# Patient Record
Sex: FEMALE | Race: WHITE | Employment: OTHER | URBAN - METROPOLITAN AREA
[De-identification: names, ages, dates, MRNs, and addresses within clinical notes are randomized per-mention and may not be internally consistent; named-entity substitution may affect disease eponyms.]

---

## 2017-08-23 ENCOUNTER — OFFICE VISIT (OUTPATIENT)
Dept: FAMILY MEDICINE CLINIC | Age: 67
End: 2017-08-23

## 2017-08-23 VITALS
RESPIRATION RATE: 16 BRPM | OXYGEN SATURATION: 98 % | BODY MASS INDEX: 26.87 KG/M2 | DIASTOLIC BLOOD PRESSURE: 79 MMHG | SYSTOLIC BLOOD PRESSURE: 132 MMHG | WEIGHT: 167.2 LBS | TEMPERATURE: 97 F | HEIGHT: 66 IN | HEART RATE: 83 BPM

## 2017-08-23 DIAGNOSIS — B07.9 VIRAL WARTS, UNSPECIFIED TYPE: ICD-10-CM

## 2017-08-23 DIAGNOSIS — M81.0 POST-MENOPAUSAL OSTEOPOROSIS: Primary | ICD-10-CM

## 2017-08-23 DIAGNOSIS — Z23 ENCOUNTER FOR IMMUNIZATION: ICD-10-CM

## 2017-08-23 RX ORDER — RISEDRONATE SODIUM 35 MG/1
35 TABLET, FILM COATED ORAL
COMMUNITY
End: 2017-08-23 | Stop reason: SDUPTHER

## 2017-08-23 RX ORDER — METRONIDAZOLE 10 MG/G
GEL TOPICAL DAILY
COMMUNITY
End: 2018-09-27 | Stop reason: SDUPTHER

## 2017-08-23 RX ORDER — DOXYCYCLINE 50 MG/1
50 CAPSULE ORAL 2 TIMES DAILY
COMMUNITY
End: 2018-04-20 | Stop reason: ALTCHOICE

## 2017-08-23 RX ORDER — SULFACETAMIDE SODIUM AND SULFUR 100; 20 MG/G; MG/G
CREAM TOPICAL
COMMUNITY

## 2017-08-23 RX ORDER — LORATADINE 10 MG/1
10 TABLET ORAL
COMMUNITY

## 2017-08-23 RX ORDER — CEPHALEXIN 500 MG/1
500 CAPSULE ORAL 4 TIMES DAILY
COMMUNITY
End: 2018-04-20 | Stop reason: ALTCHOICE

## 2017-08-23 RX ORDER — RISEDRONATE SODIUM 35 MG/1
35 TABLET, FILM COATED ORAL
Qty: 12 TAB | Refills: 3 | Status: SHIPPED | OUTPATIENT
Start: 2017-08-23 | End: 2018-08-09 | Stop reason: SDUPTHER

## 2017-08-23 NOTE — PROGRESS NOTES
Lamberto Vegas is a 74 y.o.  female and presents with    Chief Complaint   Patient presents with    Osteoporosis           Subjective:  Mrs. Homer Leal is working in the area as a [address] for her [employer]. She is here to establish care. She has h/o osteoporosis diagnosed 4 years ago; she denies h/o fractures. She denies side effects from medication and is requesting a refill. She c/o lesion on her right second finger. She has attempted to remove it with scraping. She has not applied medication to it. It is intermittently painful. She denies bleeding. She denies redness. No injury to her finger. Patient Active Problem List   Diagnosis Code    Post-menopausal osteoporosis M81.0     Patient Active Problem List    Diagnosis Date Noted    Post-menopausal osteoporosis 08/28/2017     Current Outpatient Prescriptions   Medication Sig Dispense Refill    metroNIDAZOLE (METROGEL) 1 % topical gel Apply  to affected area daily. Use a thin layer to affected areas after washing      Sulfacetamide Sodium-Sulfur (AVAR-E LS) 10-2 % crea by Apply Externally route.  risedronate (ACTONEL) 35 mg tablet Take 35 mg by mouth every seven (7) days.  doxycycline (MONODOX) 50 mg capsule Take 50 mg by mouth two (2) times a day.  cephALEXin (KEFLEX) 500 mg capsule Take 500 mg by mouth four (4) times daily.  loratadine (CLARITIN) 10 mg tablet Take 10 mg by mouth.        No Known Allergies  Past Medical History:   Diagnosis Date    Osteoporosis     Rosacea     TIA (transient ischemic attack) 2012     Past Surgical History:   Procedure Laterality Date    HX COLONOSCOPY       Family History   Problem Relation Age of Onset    Diabetes Mother     Osteoporosis Father      Social History   Substance Use Topics    Smoking status: Never Smoker    Smokeless tobacco: Never Used    Alcohol use No       ROS   General ROS: negative for - chills or fever  Psychological ROS: negative for - anxiety or depression  Ophthalmic ROS: negative for - blurry vision  ENT ROS: negative for - headaches  Endocrine ROS: negative for - polydipsia/polyuria or temperature intolerance  Respiratory ROS: no cough, shortness of breath, or wheezing  Cardiovascular ROS: no chest pain or dyspnea on exertion  Gastrointestinal ROS: no abdominal pain, change in bowel habits, or black or bloody stools  Genito-Urinary ROS: no dysuria, trouble voiding, or hematuria  Musculoskeletal ROS: negative for - joint pain or muscle pain  Neurological ROS: negative for - numbness/tingling or weakness    All other systems reviewed and are negative. Objective:  Vitals:    08/23/17 1401   BP: 132/79   Pulse: 83   Resp: 16   Temp: 97 °F (36.1 °C)   TempSrc: Oral   SpO2: 98%   Weight: 167 lb 3.2 oz (75.8 kg)   Height: 5' 6\" (1.676 m)   PainSc:   0 - No pain       alert, well appearing, and in no distress, oriented to person, place, and time and normal appearing weight  Mental status - normal mood, behavior, speech, dress, motor activity, and thought processes  Chest - clear to auscultation, no wheezes, rales or rhonchi, symmetric air entry  Heart - normal rate, regular rhythm, normal S1, S2, no murmurs, rubs, clicks or gallops  Neurological - cranial nerves II through XII intact, normal gait and station  Skin - lesion on right 2nd finger raised and flesh colored; scraped with sterile 11 blade and 3 freeze thaw cycles with liquid nitrogen performed. Assessment/Plan:    1. Post-menopausal osteoporosis  Tolerating medication well; continue treatment  - risedronate (ACTONEL) 35 mg tablet; Take 1 Tab by mouth every seven (7) days. Indications: POST-MENOPAUSAL OSTEOPOROSIS  Dispense: 12 Tab; Refill: 3    2.  Encounter for immunization    - ADMIN INFLUENZA VIRUS VAC  - INFLUENZA VIRUS VACCINE, HIGH DOSE SEASONAL, PRESERVATIVE FREE    3. Viral warts, unspecified type  Tolerated procedure well; dressed with sterile bandage  - DESTRUC BENIGN LESION, UP TO 14 LESIONS      Lab review: orders written for new lab studies as appropriate; see orders      I have discussed the diagnosis with the patient and the intended plan as seen in the above orders. The patient has received an after-visit summary and questions were answered concerning future plans. I have discussed medication side effects and warnings with the patient as well. I have reviewed the plan of care with the patient, accepted their input and they are in agreement with the treatment goals. Follow-up Disposition:  Return in about 1 month (around 9/23/2017) for medicare wellness.

## 2017-08-23 NOTE — PROGRESS NOTES
1. Have you been to the ER, urgent care clinic since your last visit? Hospitalized since your last visit? No    2. Have you seen or consulted any other health care providers outside of the [address] [place] [address] since your last visit? Include any pap smears or colon screening.  No

## 2017-08-23 NOTE — PROGRESS NOTES
1. Have you been to the ER, urgent care clinic since your last visit? Hospitalized since your last visit? No    2. Have you seen or consulted any other health care providers outside of the [address] [place] [address] since your last visit? Include any pap smears or colon screening.  No

## 2017-08-23 NOTE — MR AVS SNAPSHOT
Visit Information Date & Time Provider Department Dept. Phone Encounter #  
 8/23/2017  2:15 PM Fidelia Alarcon, [address] [phone] Follow-up Instructions Return in about 1 month (around 9/23/2017) for medicare wellness. Upcoming Health Maintenance Date Due Hepatitis C Screening 1950 DTaP/Tdap/Td series (1 - Tdap) 2/19/1973 BREAST CANCER SCRN MAMMOGRAM 2/19/2002 ZOSTER VACCINE AGE 60> 12/19/2011 GLAUCOMA SCREENING Q2Y 2/19/2017 OSTEOPOROSIS SCREENING (DEXA) 2/19/2017 Pneumococcal 65+ Low/Medium Risk (1 of 2 - PCV13) 2/19/2017 MEDICARE YEARLY EXAM 2/19/2017 INFLUENZA AGE 9 TO ADULT 8/1/2017 COLONOSCOPY 7/16/2022 Allergies as of 8/23/2017  Review Complete On: 8/23/2017 By: Fidelia Alarcon MD  
 No Known Allergies Current Immunizations  Never Reviewed Name Date Influenza High Dose Vaccine PF  Incomplete Not reviewed this visit You Were Diagnosed With   
  
 Codes Comments Post-menopausal osteoporosis    -  Primary ICD-10-CM: M81.0 ICD-9-CM: 733.01 Encounter for immunization     ICD-10-CM: P43 ICD-9-CM: V03.89 Viral warts, unspecified type     ICD-10-CM: B07.9 ICD-9-CM: 078.10 Vitals BP Pulse Temp Resp Height(growth percentile) Weight(growth percentile) 132/79 (BP 1 Location: Right arm, BP Patient Position: Sitting) 83 97 °F (36.1 °C) (Oral) 16 5' 6\" (1.676 m) 167 lb 3.2 oz (75.8 kg) SpO2 BMI Smoking Status 98% 26.99 kg/m2 Never Smoker BMI and BSA Data Body Mass Index Body Surface Area  
 26.99 kg/m 2 1.88 m 2 Preferred Pharmacy Pharmacy Name Phone [facility], [address] [place] [address] [phone] Your Updated Medication List  
  
   
This list is accurate as of: 8/23/17  3:04 PM.  Always use your most recent med list.  
  
  
  
  
 AVAR-E LS 10-2 % Crea Generic drug:  Sulfacetamide Sodium-Sulfur by Apply Externally route. cephALEXin 500 mg capsule Commonly known as:  Eulas Po Take 500 mg by mouth four (4) times daily. CLARITIN 10 mg tablet Generic drug:  loratadine Take 10 mg by mouth. doxycycline 50 mg capsule Commonly known as:  Miley Mends Take 50 mg by mouth two (2) times a day. metroNIDAZOLE 1 % topical gel Commonly known as:  [place] Apply  to affected area daily. Use a thin layer to affected areas after washing  
  
 risedronate 35 mg tablet Commonly known as:  ACTONEL Take 1 Tab by mouth every seven (7) days. Indications: POST-MENOPAUSAL OSTEOPOROSIS Prescriptions Sent to Pharmacy Refills  
 risedronate (ACTONEL) 35 mg tablet 3 Sig: Take 1 Tab by mouth every seven (7) days. Indications: POST-MENOPAUSAL OSTEOPOROSIS Class: Normal  
 Pharmacy: [address], [address] [place] [address] #: [phone] Route: Oral  
  
We Performed the Following ADMIN INFLUENZA VIRUS VAC [ [facility]] DESTRUC BENIGN LESION, UP TO 14 LESIONS [88736 CPT(R)] INFLUENZA VIRUS VACCINE, HIGH DOSE SEASONAL, PRESERVATIVE FREE [88789 CPT(R)] Follow-up Instructions Return in about 1 month (around 9/23/2017) for medicare wellness. Introducing [facility]! Rafaela Bhatt introduces Validic patient portal. Now you can access parts of your medical record, email your doctor's office, and request medication refills online. 1. In your internet browser, go to [email or web address] 2. Click on the First Time User? Click Here link in the Sign In box. You will see the New Member Sign Up page. 3. Enter your Validic Access Code exactly as it appears below. You will not need to use this code after youve completed the sign-up process. If you do not sign up before the expiration date, you must request a new code. · Validic Access Code: [email or web address] Expires: 11/21/2017  1:30 PM 
 
 4. Enter the last four digits of your Social Security Number (xxxx) and Date of Birth (mm/dd/yyyy) as indicated and click Submit. You will be taken to the next sign-up page. 5. Create a Vibrynt ID. This will be your Vibrynt login ID and cannot be changed, so think of one that is secure and easy to remember. 6. Create a Vibrynt password. You can change your password at any time. 7. Enter your Password Reset Question and Answer. This can be used at a later time if you forget your password. 8. Enter your e-mail address. You will receive e-mail notification when new information is available in [address] 9. Click Sign Up. You can now view and download portions of your medical record. 10. Click the Download Summary menu link to download a portable copy of your medical information. If you have questions, please visit the Frequently Asked Questions section of the Vibrynt website. Remember, Vibrynt is NOT to be used for urgent needs. For medical emergencies, dial 911. Now available from your iPhone and Android! Please provide this summary of care documentation to your next provider. If you have any questions after today's visit, please call [phone].

## 2017-08-28 PROBLEM — M81.0 POST-MENOPAUSAL OSTEOPOROSIS: Status: ACTIVE | Noted: 2017-08-28

## 2017-09-21 ENCOUNTER — OFFICE VISIT (OUTPATIENT)
Dept: FAMILY MEDICINE CLINIC | Age: 67
End: 2017-09-21

## 2017-09-21 VITALS
HEIGHT: 66 IN | WEIGHT: 166.4 LBS | SYSTOLIC BLOOD PRESSURE: 129 MMHG | HEART RATE: 83 BPM | TEMPERATURE: 97.7 F | BODY MASS INDEX: 26.74 KG/M2 | RESPIRATION RATE: 16 BRPM | DIASTOLIC BLOOD PRESSURE: 73 MMHG | OXYGEN SATURATION: 97 %

## 2017-09-21 DIAGNOSIS — M81.0 POST-MENOPAUSAL OSTEOPOROSIS: ICD-10-CM

## 2017-09-21 DIAGNOSIS — Z23 NEED FOR HEPATITIS B VACCINATION: ICD-10-CM

## 2017-09-21 DIAGNOSIS — Z00.00 WELCOME TO MEDICARE PREVENTIVE VISIT: Primary | ICD-10-CM

## 2017-09-21 DIAGNOSIS — Z71.89 ADVANCE CARE PLANNING: ICD-10-CM

## 2017-09-21 DIAGNOSIS — B07.9 VIRAL WARTS, UNSPECIFIED TYPE: ICD-10-CM

## 2017-09-21 NOTE — PROGRESS NOTES
Kenisha Simon is a 74 y.o female that is present in the office for a Medicare Wellness    1. Have you been to the ER, urgent care clinic since your last visit? Hospitalized since your last visit? No    2. Have you seen or consulted any other health care providers outside of the [address] [place] [address] since your last visit? Include any pap smears or colon screening.  No

## 2017-09-21 NOTE — PROGRESS NOTES
This is a \"Welcome to [employer]"  Initial Preventive Physical Examination (IPPE) providing Personalized Prevention Plan Services (Performed in the first 12 months of enrollment)    Fermín Street is a 74 y.o.  female and presents for a welcome to Medicare physical exam     Patient Active Problem List   Diagnosis Code    Post-menopausal osteoporosis M81.0     Patient Active Problem List    Diagnosis Date Noted    Post-menopausal osteoporosis 08/28/2017     Current Outpatient Prescriptions   Medication Sig Dispense Refill    metroNIDAZOLE (METROGEL) 1 % topical gel Apply  to affected area daily. Use a thin layer to affected areas after washing      Sulfacetamide Sodium-Sulfur (AVAR-E LS) 10-2 % crea by Apply Externally route.  doxycycline (MONODOX) 50 mg capsule Take 50 mg by mouth two (2) times a day.  cephALEXin (KEFLEX) 500 mg capsule Take 500 mg by mouth four (4) times daily.  loratadine (CLARITIN) 10 mg tablet Take 10 mg by mouth.  risedronate (ACTONEL) 35 mg tablet Take 1 Tab by mouth every seven (7) days. Indications: POST-MENOPAUSAL OSTEOPOROSIS 12 Tab 3     No Known Allergies  Past Medical History:   Diagnosis Date    Osteoporosis     Rosacea     TIA (transient ischemic attack) 2012     Past Surgical History:   Procedure Laterality Date    HX COLONOSCOPY       Family History   Problem Relation Age of Onset    Diabetes Mother     Osteoporosis Father      Social History   Substance Use Topics    Smoking status: Never Smoker    Smokeless tobacco: Never Used    Alcohol use No       I have reviewed the patient's medical history in detail and updated the computerized patient record.      History     Past Medical History:   Diagnosis Date    Osteoporosis     Rosacea     TIA (transient ischemic attack) 2012      Past Surgical History:   Procedure Laterality Date    HX COLONOSCOPY       Current Outpatient Prescriptions   Medication Sig Dispense Refill    metroNIDAZOLE (METROGEL) 1 % topical gel Apply  to affected area daily. Use a thin layer to affected areas after washing      Sulfacetamide Sodium-Sulfur (AVAR-E LS) 10-2 % crea by Apply Externally route.  doxycycline (MONODOX) 50 mg capsule Take 50 mg by mouth two (2) times a day.  cephALEXin (KEFLEX) 500 mg capsule Take 500 mg by mouth four (4) times daily.  loratadine (CLARITIN) 10 mg tablet Take 10 mg by mouth.  risedronate (ACTONEL) 35 mg tablet Take 1 Tab by mouth every seven (7) days. Indications: POST-MENOPAUSAL OSTEOPOROSIS 12 Tab 3     No Known Allergies  Family History   Problem Relation Age of Onset    Diabetes Mother     Osteoporosis Father      Social History   Substance Use Topics    Smoking status: Never Smoker    Smokeless tobacco: Never Used    Alcohol use No       generally follows a low fat low cholesterol diet, generally follows a low sodium diet, does not rigorously follow a diabetic diet, nonsmoker    very active    Health Maintenance History  Immunizations reviewed, dtap up to date , pneumovax 2017, flu up to date, zoster up to date  Colonoscopy: up to date,   Eye exam: due  Mammo up to date  Dexascan up to date    Depression Risk Factor Screening:      Patient Health Questionnaire (PHQ-2)   Over the last 2 weeks, how often have you been bothered by any of the following problems? · Little interest or pleasure in doing things? · Not at all. [0]  · Feeling down, depressed, or hopeless? · Not at all. [0]    Total Score: 0/6  PHQ-2 Assessment Scoring:   A score of 2 or more requires further screening with the PHQ-9    Alcohol Risk Factor Screening:     Women: On any occasion during the past 3 months, have you had more than 3 drinks containing alcohol?  no   Do you average more than 7 drinks per week?  no    Functional Ability and Level of Safety:     Hearing Loss    Hearing is good. Activities of Daily Living   Self-care.    Requires assistance with: no ADLs    Fall Risk   No fall risk factors    Abuse Screen   Patient is not abused    Review of Systems   ROS   General ROS: negative for - chills or fever  Psychological ROS: negative for - anxiety or depression  Ophthalmic ROS: negative for - blurry vision  ENT ROS: negative for - headaches  Endocrine ROS: negative for - polydipsia/polyuria or temperature intolerance  Respiratory ROS: no cough, shortness of breath, or wheezing  Cardiovascular ROS: no chest pain or dyspnea on exertion  Gastrointestinal ROS: no abdominal pain, change in bowel habits, or black or bloody stools  Genito-Urinary ROS: no dysuria, trouble voiding, or hematuria  Musculoskeletal ROS: negative for - joint pain or muscle pain  Neurological ROS: no TIA or stroke symptoms  Dermatological ROS: negative for - rash or skin lesion changes    All other systems reviewed and are negative. Examination   Physical Examination  Vitals:    09/21/17 1453   BP: 129/73   Pulse: 83   Resp: 16   Temp: 97.7 °F (36.5 °C)   TempSrc: Oral   SpO2: 97%   Weight: 166 lb 6.4 oz (75.5 kg)   Height: 5' 6\" (1.676 m)   PainSc:   0 - No pain      Body mass index is 26.86 kg/(m^2). Visual Acuity: Uncorrected:            L  20/15            R 20/15  EKG Screening: normal EKG, normal sinus rhythm. End-of-life planning  Advanced Directive in the case than an injury or illness causes the patient to be unable to make health care decisions  Health Care Directive or Living Will: yes    Assessment/Plan:   Education and counseling provided:  End-of-Life planning (with patient's consent)  Pneumococcal Vaccine  Influenza Vaccine  Diabetes screening test    ICD-10-CM ICD-9-CM    1. Welcome to Medicare preventive visit Z00.00 V70.0 REFERRAL TO OPHTHALMOLOGY      UT EKG, SCREEN, INIT PREV EXAM W/ INTERP/REPORT   2. Advance care planning Z71.89 V65.49    3. Viral warts, unspecified type B07.9 078.10    4. Post-menopausal osteoporosis M81.0 733.01    5.  Need for hepatitis B vaccination Z23 V05.3 HEPATITIS B VACCINE, ADULT DOSAGE, IM   . Brief written plan, checklist    I have discussed the diagnosis with the patient and the intended plan as seen in the above orders. The patient has received an after-visit summary and questions were answered concerning future plans. I have discussed medication side effects and warnings with the patient as well. I have reviewed the plan of care with the patient, accepted their input and they are in agreement with the treatment goals. Follow-up Disposition: Not on File      ____________________________________________________________    Problem Assessment    for treatment of   Chief Complaint   Patient presents with    Welcome To Medicare         SUBJECTIVE    Well Adult Physical   Patient here for a comprehensive physical exam.The patient reports problems - rosacea and osteoporosis  Do you take any herbs or supplements that were not prescribed by a doctor? yes Are you taking calcium supplements? yes Are you taking aspirin daily? not applicable    Visit Vitals    /73 (BP 1 Location: Right arm, BP Patient Position: Sitting)    Pulse 83    Temp 97.7 °F (36.5 °C) (Oral)    Resp 16    Ht 5' 6\" (1.676 m)    Wt 166 lb 6.4 oz (75.5 kg)    SpO2 97%    BMI 26.86 kg/m2     General:  Alert, cooperative, no distress, appears stated age. Head:  Normocephalic, without obvious abnormality, atraumatic. Eyes:  Conjunctivae/corneas clear. PERRL, EOMs intact. Fundi benign. Ears:  Normal TMs and external ear canals both ears. Nose: Nares normal. Septum midline. Mucosa normal. No drainage or sinus tenderness. Throat: Lips, mucosa, and tongue normal. Teeth and gums normal.   Neck: Supple, symmetrical, trachea midline, no adenopathy, thyroid: no enlargement/tenderness/nodules, no carotid bruit and no JVD. Back:   Symmetric, no curvature. ROM normal. No CVA tenderness. Lungs:   Clear to auscultation bilaterally. Chest wall:  No tenderness or deformity.    Heart:  Regular rate and rhythm, S1, S2 normal, no murmur, click, rub or gallop. Breast Exam:  deferred   Abdomen:   Soft, non-tender. Bowel sounds normal. No masses,  No organomegaly. Genitalia:  deferred   Rectal:  deferred   Extremities: Extremities normal, atraumatic, no cyanosis or edema. Pulses: 2+ and symmetric all extremities. Skin: Skin color, texture, turgor normal. No rashes or lesions. Lymph nodes: Cervical, supraclavicular, and axillary nodes normal.   Neurologic: CNII-XII intact. Normal strength, sensation and reflexes throughout. TESTS  EKG - normal sinus rhythm    Assessment/Plan:    1. Welcome to Medicare preventive visit  Reviewed preventive recommendations  - REFERRAL TO OPHTHALMOLOGY  - TX EKG, SCREEN, INIT PREV EXAM W/ INTERP/REPORT    2. Advance care planning  See ACP    3. Viral warts, unspecified type  Cryotherapy for wart    4. Post-menopausal osteoporosis  Continue current medication    5.  Need for hepatitis B vaccination    - Hepatitis B vaccine, adult dosage, IM        Lab review: no lab studies available for review at time of visit

## 2017-09-21 NOTE — MR AVS SNAPSHOT
Visit Information Date & Time Provider Department Dept. Phone Encounter #  
 9/21/2017  3:00 PM Anna Brito, [facility] [phone] Upcoming Health Maintenance Date Due Hepatitis C Screening 1950 DTaP/Tdap/Td series (1 - Tdap) 2/19/1973 BREAST CANCER SCRN MAMMOGRAM 2/19/2002 ZOSTER VACCINE AGE 60> 12/19/2011 GLAUCOMA SCREENING Q2Y 2/19/2017 Pneumococcal 65+ Low/Medium Risk (1 of 2 - PCV13) 2/19/2017 MEDICARE YEARLY EXAM 2/19/2017 COLONOSCOPY 7/16/2022 Allergies as of 9/21/2017  Review Complete On: 9/21/2017 By: Anna Brito MD  
 No Known Allergies Current Immunizations  Reviewed on 8/26/2017 Name Date Hep B Vaccine (Adult) 9/21/2017 Influenza High Dose Vaccine PF 8/28/2017 Not reviewed this visit You Were Diagnosed With   
  
 Codes Comments Welcome to Medicare preventive visit    -  Primary ICD-10-CM: Z00.00 ICD-9-CM: V70.0 Advance care planning     ICD-10-CM: Z71.89 ICD-9-CM: V65.49 Viral warts, unspecified type     ICD-10-CM: B07.9 ICD-9-CM: 078.10 Post-menopausal osteoporosis     ICD-10-CM: M81.0 ICD-9-CM: 733.01 Need for hepatitis B vaccination     ICD-10-CM: S93 ICD-9-CM: V05.3 Vitals BP Pulse Temp Resp Height(growth percentile) Weight(growth percentile) 129/73 (BP 1 Location: Right arm, BP Patient Position: Sitting) 83 97.7 °F (36.5 °C) (Oral) 16 5' 6\" (1.676 m) 166 lb 6.4 oz (75.5 kg) SpO2 BMI Smoking Status 97% 26.86 kg/m2 Never Smoker BMI and BSA Data Body Mass Index Body Surface Area  
 26.86 kg/m 2 1.88 m 2 [facility]erred Pharmacy Pharmacy [facility] Phone [facility], [address] [place] [address] [phone] Your Updated Medication List  
  
   
This list is accurate as of: 9/21/17  3:57 PM.  Always use your most recent med list.  
  
  
  
  
 AVAR-E LS 10-2 % Crea Generic drug:  Sulfacetamide Sodium-Sulfur  
by Apply Externally route. cephALEXin 500 mg capsule Commonly known as:  Junius Alpers Take 500 mg by mouth four (4) times daily. CLARITIN 10 mg tablet Generic drug:  loratadine Take 10 mg by mouth. doxycycline 50 mg capsule Commonly known as:  Dmitry Odonnell Take 50 mg by mouth two (2) times a day. metroNIDAZOLE 1 % topical gel Commonly known as:  Milana Heads Apply  to affected area daily. Use a thin layer to affected areas after washing  
  
 risedronate 35 mg tablet Commonly known as:  ACTONEL Take 1 Tab by mouth every seven (7) days. Indications: POST-MENOPAUSAL OSTEOPOROSIS We Performed the Following HEPATITIS B VACCINE, ADULT DOSAGE, IM [41782 CPT(R)] MO EKG, SCREEN, INIT PREV EXAM W/ INTERP/REPORT [ [facility]] REFERRAL TO OPHTHALMOLOGY [REF57 Custom] Comments:  
 Please evaluate 75 y/o female patient for annual eye exam.  
  
Referral Information Referral ID Referred By Referred To  
  
 [id number] Jessica DWYER [facility], [address] [facility] Phone: [phone] Fax: [phone] Visits Status Start Date End Date 1 New Request 9/21/17 9/21/18 If your referral has a status of pending review or denied, additional information will be sent to support the outcome of this decision. Introducing [facility] & HEALTH SERVICES! Edgar Estrella introduces [employer] patient portal. Now you can access parts of your medical record, email your doctor's office, and request medication refills online. 1. In your internet browser, go to [email or web address] 2. Click on the First Time User? Click Here link in the Sign In box. You will see the New Member Sign Up page. 3. Enter your [employer] Access Code exactly as it appears below. You will not need to use this code after youve completed the sign-up process.  If you do not sign up before the expiration date, you must request a new code. · [employer] Access Code: [email or web address] Expires: 11/21/2017  1:30 PM 
 
4. Enter the last four digits of your Social Security Number (xxxx) and Date of Birth (mm/dd/yyyy) as indicated and click Submit. You will be taken to the next sign-up page. 5. Create a [employer] ID. This will be your [employer] login ID and cannot be changed, so think of one that is secure and easy to remember. 6. Create a [employer] password. You can change your password at any time. 7. Enter your Password Reset Question and Answer. This can be used at a later time if you forget your password. 8. Enter your e-mail address. You will receive e-mail notification when new information is available in [address] 9. Click Sign Up. You can now view and download portions of your medical record. 10. Click the Download Summary menu link to download a portable copy of your medical information. If you have questions, please visit the Frequently Asked Questions section of the [employer] website. Remember, [employer] is NOT to be used for urgent needs. For medical emergencies, dial 911. Now available from your iPhone and Android! Please provide this summary of care documentation to your next provider. If you have any questions after today's visit, please call [phone].

## 2017-09-27 NOTE — ACP (ADVANCE CARE PLANNING)
Advance Care Planning (ACP) Provider Note - Comprehensive     Date of ACP Conversation: 09/21/17  Persons included in Conversation:  patient  Length of ACP Conversation in minutes:  <16 minutes (Non-Billable)    Authorized Decision Maker (if patient is incapable of making informed decisions): This person is:  her           General ACP for ALL Patients with Decision Making Capacity:   Importance of advance care planning, including choosing a healthcare agent to communicate patient's healthcare decisions if patient lost the ability to make decisions, such as after a sudden illness or accident  Understanding of the healthcare agent role was assessed and information provided  Exploration of values, goals, and preferences if recovery is not expected, even with continued medical treatment in the event of: Imminent death  Severe, permanent brain injury  \"In these circumstances, what matters most to you? \"  Care focused more on comfort or quality of life. \"What, if any, treatments would you want to avoid? \" none  Opportunity offered to explore how cultural, Bahai, spiritual, or personal beliefs would affect decisions for future care     Review of Existing Advance Directive:  What information were you given about medical decisions to consider before completing your advance directive? power of      For Serious or Chronic Illness:  No known illness    Interventions Provided:  Recommended completion of Advance Directive form after review of ACP materials and conversation with prospective healthcare agent

## 2018-02-06 ENCOUNTER — HOSPITAL ENCOUNTER (OUTPATIENT)
Dept: LAB | Age: 68
Discharge: HOME | End: 2018-02-06
Payer: MEDICARE

## 2018-02-06 ENCOUNTER — OFFICE VISIT (OUTPATIENT)
Dept: OBGYN CLINIC | Age: 68
End: 2018-02-06

## 2018-02-06 VITALS
HEIGHT: 66 IN | WEIGHT: 163 LBS | BODY MASS INDEX: 26.2 KG/M2 | HEART RATE: 78 BPM | DIASTOLIC BLOOD PRESSURE: 75 MMHG | SYSTOLIC BLOOD PRESSURE: 123 MMHG

## 2018-02-06 DIAGNOSIS — Z12.31 VISIT FOR SCREENING MAMMOGRAM: ICD-10-CM

## 2018-02-06 DIAGNOSIS — Z01.419 WELL WOMAN EXAM WITH ROUTINE GYNECOLOGICAL EXAM: Primary | ICD-10-CM

## 2018-02-06 PROCEDURE — 87624 HPV HI-RISK TYP POOLED RSLT: CPT | Performed by: OBSTETRICS & GYNECOLOGY

## 2018-02-06 PROCEDURE — 88175 CYTOPATH C/V AUTO FLUID REDO: CPT | Performed by: OBSTETRICS & GYNECOLOGY

## 2018-02-06 NOTE — MR AVS SNAPSHOT
[address] [facility] 
 
 
 [address] [facility] [address] [address] 
[phone] Patient: Shola Tipton MRN: [id number] JENSEN:[id number] Visit Information Date & Time Provider Department Dept. Phone Encounter #  
 2/6/2018  3:00 PM [phone] [id number] Your Appointments 3/21/2018  3:30 PM  
Office Visit with Darvin Villatoro MD  
[address] [address]) Appt Note: Return in 6 months (3/21/18) for pneumonia Vaccine. [address] [facility]  
  
   
 [address] [place] [address] [address] Upcoming Health Maintenance Date Due Hepatitis C Screening 1950 BREAST CANCER SCRN MAMMOGRAM 2/19/2002 ZOSTER VACCINE AGE 60> 12/19/2011 COLONOSCOPY 7/16/2022 Allergies as of 2/6/2018  Review Complete On: 2/6/2018 By: Sudhakar Mccollum MD  
 No Known Allergies Current Immunizations  Reviewed on 8/26/2017 Name Date Hep B Vaccine (Adult) 9/21/2017 Influenza High Dose Vaccine PF 8/23/2017 Not reviewed this visit You Were Diagnosed With   
  
 Codes Comments Well woman exam with routine gynecological exam    -  Primary ICD-10-CM: T98.894 ICD-9-CM: V72.31 Visit for screening mammogram     ICD-10-CM: Z12.31 
ICD-9-CM: V76.12 Vitals BP Pulse Height(growth percentile) Weight(growth percentile) BMI OB Status 123/75 78 5' 6\" (1.676 m) 163 lb (73.9 kg) 26.31 kg/m2 Postmenopausal  
 Smoking Status Never Smoker BMI and BSA Data Body Mass Index Body Surface Area  
 26.31 kg/m 2 1.86 m 2 Preferred Pharmacy Pharmacy Name Phone [facility], [address] [facility] [phone] Your Updated Medication List  
  
   
This list is accurate as of: 2/6/18  4:10 PM.  Always use your most recent med list.  
  
  
  
  
 AVAR-E LS 10-2 % Crea Generic drug:  Sulfacetamide Sodium-Sulfur  
by Apply Externally route. cephALEXin 500 mg capsule Commonly known as:  Ashok Leo Take 500 mg by mouth four (4) times daily. CLARITIN 10 mg tablet Generic drug:  loratadine Take 10 mg by mouth. doxycycline 50 mg capsule Commonly known as:  Carleen Steven Take 50 mg by mouth two (2) times a day. metroNIDAZOLE 1 % topical gel Commonly known as:  Clarence Lo Apply  to affected area daily. Use a thin layer to affected areas after washing  
  
 risedronate 35 mg tablet Commonly known as:  ACTONEL Take 1 Tab by mouth every seven (7) days. Indications: POST-MENOPAUSAL OSTEOPOROSIS To-Do List   
 02/06/2018 Imaging:  SARAH MAMMO BI SCREENING INCL CAD Introducing [facility] & HEALTH SERVICES! Graham Paiz introduces [employer] patient portal. Now you can access parts of your medical record, email your doctor's office, and request medication refills online. 1. In your internet browser, go to [email or web address] 2. Click on the First Time User? Click Here link in the Sign In box. You will see the New Member Sign Up page. 3. Enter your [employer] Access Code exactly as it appears below. You will not need to use this code after youve completed the sign-up process. If you do not sign up before the expiration date, you must request a new code. · [employer] Access Code: [email or web address] Expires: 5/7/2018  4:10 PM 
 
4. Enter the last four digits of your Social Security Number (xxxx) and Date of Birth (mm/dd/yyyy) as indicated and click Submit. You will be taken to the next sign-up page. 5. Create a Win[employer] ID. This will be your [employer] login ID and cannot be changed, so think of one that is secure and easy to remember. 6. Create a [employer] password. You can change your password at any time. 7. Enter your Password Reset Question and Answer. This can be used at a later time if you forget your password. 8. Enter your e-mail address. You will receive e-mail notification when new information is available in [address] 9. Click Sign Up. You can now view and download portions of your medical record. 10. Click the Download Summary menu link to download a portable copy of your medical information. If you have questions, please visit the Frequently Asked Questions section of the [employer] website. Remember, [employer] is NOT to be used for urgent needs. For medical emergencies, dial 911. Now available from your iPhone and Android! Please provide this summary of care documentation to your next provider. If you have any questions after today's visit, please call [phone].

## 2018-02-06 NOTE — PROGRESS NOTES
Subjective:   74 y.o. female for Well Woman Check. She is new to the area and a bit uncertain about her last well woman exam.  She has no GYN concerns today, but does note she has prolapse- diagnosed several years ago and managed with Kegel exercises. No LMP recorded. Patient is postmenopausal.    Social History: single partner, contraception - post menopausal status. Pertinent past medical hstory: no history of HTN, DVT, CAD, DM, liver disease, migraines or smoking. Patient Active Problem List   Diagnosis Code    Post-menopausal osteoporosis M81.0     Current Outpatient Prescriptions   Medication Sig Dispense Refill    metroNIDAZOLE (METROGEL) 1 % topical gel Apply  to affected area daily. Use a thin layer to affected areas after washing      Sulfacetamide Sodium-Sulfur (AVAR-E LS) 10-2 % crea by Apply Externally route.  loratadine (CLARITIN) 10 mg tablet Take 10 mg by mouth.  risedronate (ACTONEL) 35 mg tablet Take 1 Tab by mouth every seven (7) days. Indications: POST-MENOPAUSAL OSTEOPOROSIS 12 Tab 3    doxycycline (MONODOX) 50 mg capsule Take 50 mg by mouth two (2) times a day.  cephALEXin (KEFLEX) 500 mg capsule Take 500 mg by mouth four (4) times daily. No Known Allergies  Past Medical History:   Diagnosis Date    Osteoporosis     Rosacea     TIA (transient ischemic attack) 2012     Past Surgical History:   Procedure Laterality Date    HX COLONOSCOPY       Family History   Problem Relation Age of Onset    Diabetes Mother     Osteoporosis Father     Heart Disease Father      Social History   Substance Use Topics    Smoking status: Never Smoker    Smokeless tobacco: Never Used    Alcohol use No        ROS:  Feeling well. No dyspnea or chest pain on exertion. No abdominal pain, change in bowel habits, black or bloody stools. No urinary tract symptoms. GYN ROS: no breast pain or new or enlarging lumps on self exam. No neurological complaints.     Objective:     Visit Vitals    /75    Pulse 78    Ht 5' 6\" (1.676 m)    Wt 163 lb (73.9 kg)    BMI 26.31 kg/m2     The patient appears well, alert, oriented x 3, in no distress. ENT normal.  Neck supple. No adenopathy or thyromegaly. BECKY. Lungs are clear, good air entry, no wheezes, rhonchi or rales. S1 and S2 normal, no murmurs, regular rate and rhythm. Abdomen soft without tenderness, guarding, mass or organomegaly. Extremities show no edema, normal peripheral pulses. Neurological is normal, no focal findings. BREAST EXAM: breasts appear normal, no suspicious masses, no skin or nipple changes or axillary nodes    PELVIC EXAM: normal external genitalia, vulva, vagina, cervix, uterus and adnexa, Noted Stage 2/3 cystocele. Minimal rectocele. Assessment/Plan:   well woman- compliant with Actonel. Last BMD done in  [place] last year and improving per patient. mammogram  pap smear  counseled on breast self exam, osteoporosis and adequate intake of calcium and vitamin D  return annually or prn    ICD-10-CM ICD-9-CM    1. Well woman exam with routine gynecological exam Z01.419 V72.31 PAP IG, APTIMA HPV AND RFX 16/18,45 ([id number])   2. Visit for screening mammogram Z12.31 V76.12 SARAH MAMMO BI SCREENING INCL CAD   .

## 2018-02-06 NOTE — PATIENT INSTRUCTIONS
Well Visit, Women 48 to 72: Care Instructions  Your Care Instructions    Physical exams can help you stay healthy. Your doctor has checked your overall health and may have suggested ways to take good care of yourself. He or she also may have recommended tests. At home, you can help prevent illness with healthy eating, regular exercise, and other steps. Follow-up care is a key part of your treatment and safety. Be sure to make and go to all appointments, and call your doctor if you are having problems. It's also a good idea to know your test results and keep a list of the medicines you take. How can you care for yourself at home? · Reach and stay at a healthy weight. This will lower your risk for many problems, such as obesity, diabetes, heart disease, and high blood pressure. · Get at least 30 minutes of exercise on most days of the week. Walking is a good choice. You also may want to do other activities, such as running, swimming, cycling, or playing tennis or team sports. · Do not smoke. Smoking can make health problems worse. If you need help quitting, talk to your doctor about stop-smoking programs and medicines. These can increase your chances of quitting for good. · Protect your skin from too much sun. When you're outdoors from 10 a.m. to 4 p.m., stay in the shade or cover up with clothing and a hat with a wide brim. Wear sunglasses that block UV rays. Even when it's cloudy, put broad-spectrum sunscreen (SPF 30 or higher) on any exposed skin. · See a dentist one or two times a year for checkups and to have your teeth cleaned. · Wear a seat belt in the car. · Limit alcohol to 1 drink a day. Too much alcohol can cause health problems. Follow your doctor's advice about when to have certain tests. These tests can spot problems early. · Cholesterol.  Your doctor will tell you how often to have this done based on your age, family history, or other things that can increase your risk for heart attack and stroke. · Blood pressure. Have your blood pressure checked during a routine doctor visit. Your doctor will tell you how often to check your blood pressure based on your age, your blood pressure results, and other factors. · Mammogram. Ask your doctor how often you should have a mammogram, which is an X-ray of your breasts. A mammogram can spot breast cancer before it can be felt and when it is easiest to treat. · Pap test and pelvic exam. Ask your doctor how often you should have a Pap test. You may not need to have a Pap test as often as you used to. · Vision. Have your eyes checked every year or two or as often as your doctor suggests. Some experts recommend that you have yearly exams for glaucoma and other age-related eye problems starting at age 48. · Hearing. Tell your doctor if you notice any change in your hearing. You can have tests to find out how well you hear. · Diabetes. Ask your doctor whether you should have tests for diabetes. · Colon cancer. You should begin tests for colon cancer at age 48. You may have one of several tests. Your doctor will tell you how often to have tests based on your age and risk. Risks include whether you already had a precancerous polyp removed from your colon or whether your parents, sisters and brothers, or children have had colon cancer. · Thyroid disease. Talk to your doctor about whether to have your thyroid checked as part of a regular physical exam. Women have an increased chance of a thyroid problem. · Osteoporosis. You should begin tests for bone density at age 72. If you are younger than 72, ask your doctor whether you have factors that may increase your risk for this disease. You may want to have this test before age 72. · Heart attack and stroke risk. At least every 4 to 6 years, you should have your risk for heart attack and stroke assessed.  Your doctor uses factors such as your age, blood pressure, cholesterol, and whether you smoke or have diabetes to show what your risk for a heart attack or stroke is over the next 10 years. When should you call for help? Watch closely for changes in your health, and be sure to contact your doctor if you have any problems or symptoms that concern you. Where can you learn more? Go to http://kyle-elvis.info/. Enter A334 in the search box to learn more about \"Well Visit, Women 50 to 72: Care Instructions. \"  Current as of: May 12, 2017  Content Version: 11.4  © 1474-2589 Healthwise, Incorporated. Care instructions adapted under license by [employer] (which disclaims liability or warranty for this information). If you have questions about a medical condition or this instruction, always ask your healthcare professional. [address] any warranty or liability for your use of this information.

## 2018-02-20 ENCOUNTER — HOSPITAL ENCOUNTER (OUTPATIENT)
Dept: MAMMOGRAPHY | Age: 68
Discharge: HOME | End: 2018-02-20
Attending: OBSTETRICS & GYNECOLOGY
Payer: MEDICARE

## 2018-02-20 DIAGNOSIS — Z12.31 VISIT FOR SCREENING MAMMOGRAM: ICD-10-CM

## 2018-02-20 PROCEDURE — 77067 SCR MAMMO BI INCL CAD: CPT

## 2018-03-21 ENCOUNTER — OFFICE VISIT (OUTPATIENT)
Dept: FAMILY MEDICINE CLINIC | Age: 68
End: 2018-03-21

## 2018-03-21 DIAGNOSIS — Z23 ENCOUNTER FOR IMMUNIZATION: Primary | ICD-10-CM

## 2018-03-21 NOTE — PROGRESS NOTES
Campbell Otto is a 79 y.o female that is present in the office for a nurse visit for hearing check and Prevnair 13.    1. Have you been to the ER, urgent care clinic since your last visit? Hospitalized since your last visit? No    2. Have you seen or consulted any other health care providers outside of the [address] [place] [address] since your last visit? Include any pap smears or colon screening.  No     Health Maintenance Due   Topic Date Due    Hepatitis C Screening  1950    DTaP/Tdap/Td series (1 - Tdap) 02/19/1973    ZOSTER VACCINE AGE 60>  12/19/2011    Pneumococcal 65+ Low/Medium Risk (1 of 2 - PCV13) 02/19/2017

## 2018-03-28 NOTE — PROGRESS NOTES
Immunization administered 3/21/2018 by Tacey Prim   Patient tolerated procedure well. No reactions noted.

## 2018-04-20 ENCOUNTER — OFFICE VISIT (OUTPATIENT)
Dept: FAMILY MEDICINE CLINIC | Age: 68
End: 2018-04-20

## 2018-04-20 VITALS
TEMPERATURE: 96.9 F | DIASTOLIC BLOOD PRESSURE: 80 MMHG | RESPIRATION RATE: 18 BRPM | HEIGHT: 66 IN | SYSTOLIC BLOOD PRESSURE: 126 MMHG | WEIGHT: 164 LBS | BODY MASS INDEX: 26.36 KG/M2 | OXYGEN SATURATION: 99 % | HEART RATE: 80 BPM

## 2018-04-20 DIAGNOSIS — H93.8X1 SENSATION OF FULLNESS IN RIGHT EAR: Primary | ICD-10-CM

## 2018-04-20 DIAGNOSIS — J30.1 SEASONAL ALLERGIC RHINITIS DUE TO POLLEN: ICD-10-CM

## 2018-04-20 NOTE — PROGRESS NOTES
Kobe Wilson is a 79 y.o.  female and presents with    Chief Complaint   Patient presents with   Gerard Albino MsEmeka Barraza presents for evaluation of hearing and feelings of ear fullness. she has not had formal audiology exam.  she is not using hearing aids      Patient Active Problem List   Diagnosis Code    Post-menopausal osteoporosis M81.0     Patient Active Problem List    Diagnosis Date Noted    Post-menopausal osteoporosis 08/28/2017     Current Outpatient Prescriptions   Medication Sig Dispense Refill    metroNIDAZOLE (METROGEL) 1 % topical gel Apply  to affected area daily. Use a thin layer to affected areas after washing      Sulfacetamide Sodium-Sulfur (AVAR-E LS) 10-2 % crea by Apply Externally route.  loratadine (CLARITIN) 10 mg tablet Take 10 mg by mouth.  risedronate (ACTONEL) 35 mg tablet Take 1 Tab by mouth every seven (7) days.  Indications: POST-MENOPAUSAL OSTEOPOROSIS 12 Tab 3     No Known Allergies  Past Medical History:   Diagnosis Date    Osteoporosis     Rosacea     TIA (transient ischemic attack) 2012     Past Surgical History:   Procedure Laterality Date    HX COLONOSCOPY       Family History   Problem Relation Age of Onset    Diabetes Mother     Osteoporosis Father     Heart Disease Father      Social History   Substance Use Topics    Smoking status: Never Smoker    Smokeless tobacco: Never Used    Alcohol use No       ROS   General ROS: negative for - chills or fever  Psychological ROS: negative for - anxiety or depression  Ophthalmic ROS: negative for - blurry vision  ENT ROS: negative for - headaches  Endocrine ROS: negative for - polydipsia/polyuria or temperature intolerance  Respiratory ROS: no cough, shortness of breath, or wheezing  Cardiovascular ROS: no chest pain or dyspnea on exertion  Gastrointestinal ROS: no abdominal pain, change in bowel habits, or black or bloody stools  Genito-Urinary ROS: no dysuria, trouble voiding, or hematuria  Musculoskeletal ROS: negative for - joint pain or muscle pain  Neurological ROS: no TIA or stroke symptoms  Dermatological ROS: negative for - rash or skin lesion changes    All other systems reviewed and are negative. Objective:  Vitals:    04/20/18 1600   BP: 126/80   Pulse: 80   Resp: 18   Temp: 96.9 °F (36.1 °C)   TempSrc: Oral   SpO2: 99%   Weight: 164 lb (74.4 kg)   Height: 5' 6\" (1.676 m)   PainSc:   0 - No pain       alert, well appearing, and in no distress, oriented to person, place, and time and normal appearing weight  Mental status - normal mood, behavior, speech, dress, motor activity, and thought processes  Ears - bilateral TM's and external ear canals normal      Assessment/Plan:    1. Sensation of fullness in right ear  Recommend formal evaluation by audiology    2. Seasonal allergic rhinitis due to pollen  Encourage use of anti histamine      Lab review: no lab studies available for review at time of visit      I have discussed the diagnosis with the patient and the intended plan as seen in the above orders. The patient has received an after-visit summary and questions were answered concerning future plans. I have discussed medication side effects and warnings with the patient as well. I have reviewed the plan of care with the patient, accepted their input and they are in agreement with the treatment goals. Follow-up Disposition:  Return if symptoms worsen or fail to improve.

## 2018-04-20 NOTE — PROGRESS NOTES
Room #  18  Chief Complaint:  Nurse visit for hearing      SUBJECTIVE:    Marina Mathews is a 79 y.o. female who presents today for nurse visit for hearing  1. Have you been to the ER, urgent care clinic since your last visit? Hospitalized since your last visit? NO    2. Have you seen or consulted any other health care providers outside of the [address] [place] [address] since your last visit? Include any pap smears or colon screening. NO  When :  Reason:        Health Maintenance reviewed Yes    Health Maintenance Due   Topic Date Due    Hepatitis C Screening  1950    DTaP/Tdap/Td series (1 - Tdap) 02/19/1973    ZOSTER VACCINE AGE 60>  12/19/2011

## 2018-04-20 NOTE — MR AVS SNAPSHOT
[address] [id number] [facility] [address] [facility] [address] [id number] 
[phone] Patient: Nickolas Scheuermann MRN: [id number] RJU:[birth year] Visit Information Date & Time Provider Department Dept. Phone Encounter #  
 4/20/2018  3:45 PM Darshana Harrison, [address] [place] [address] [phone] [id number] Follow-up Instructions Return if symptoms worsen or fail to improve. Upcoming Health Maintenance Date Due Hepatitis C Screening 1950 DTaP/Tdap/Td series (1 - Tdap) 2/19/1973 ZOSTER VACCINE AGE 60> 12/19/2011 MEDICARE YEARLY EXAM 9/22/2018 Pneumococcal 65+ Low/Medium Risk (2 of 2 - PPSV23) 3/21/2019 GLAUCOMA SCREENING Q2Y 10/12/2019 BREAST CANCER SCRN MAMMOGRAM 2/20/2020 COLONOSCOPY 7/16/2022 Allergies as of 4/20/2018  Review Complete On: 4/20/2018 By: Darshana Harrison MD  
 No Known Allergies Current Immunizations  Reviewed on 8/26/2017 Name Date Hep B Vaccine (Adult) 9/21/2017 Influenza High Dose Vaccine PF 8/23/2017 Pneumococcal Conjugate (PCV-13) 3/21/2018 Not reviewed this visit You Were Diagnosed With   
  
 Codes Comments Sensation of fullness in right ear    -  Primary ICD-10-CM: H93.8X1 ICD-9-CM: 388.8 Seasonal allergic rhinitis due to pollen     ICD-10-CM: J30.1 ICD-9-CM: 477.0 Vitals BP Pulse Temp Resp Height(growth percentile) Weight(growth percentile) 126/80 (BP 1 Location: Left arm, BP Patient Position: Sitting) 80 96.9 °F (36.1 °C) (Oral) 18 5' 6\" (1.676 m) 164 lb (74.4 kg) SpO2 BMI OB Status Smoking Status 99% 26.47 kg/m2 Postmenopausal Never Smoker Vitals History BMI and BSA Data Body Mass Index Body Surface Area  
 26.47 kg/m 2 1.86 m 2 Preferred Pharmacy Pharmacy Name Phone [phone] Your Updated Medication List  
  
   
 This list is accurate as of 18  4:18 PM.  Always use your most recent med list.  
  
  
  
  
 AVAR-E LS 10-2 % Crea Generic drug:  Sulfacetamide Sodium-Sulfur  
by Apply Externally route. CLARITIN 10 mg tablet Generic drug:  loratadine Take 10 mg by mouth.  
  
 metroNIDAZOLE 1 % topical gel Commonly known as:  Rosaleen Hole Apply  to affected area daily. Use a thin layer to affected areas after washing  
  
 risedronate 35 mg tablet Commonly known as:  ACTONEL Take 1 Tab by mouth every seven (7) days. Indications: POST-MENOPAUSAL OSTEOPOROSIS Follow-up Instructions Return if symptoms worsen or fail to improve. Patient Instructions Allergies: Care Instructions Your Care Instructions Allergies occur when your body's defense system (immune system) overreacts to certain substances. The immune system treats a harmless substance as if it were a harmful germ or virus. Many things can cause this overreaction, including pollens, medicine, food, dust, animal dander, and mold. Allergies can be mild or severe. Mild allergies can be managed with home treatment. But medicine may be needed to prevent problems. Managing your allergies is an important part of staying healthy. Your doctor may suggest that you have allergy testing to help find out what is causing your allergies. When you know what things trigger your symptoms, you can avoid them. This can prevent allergy symptoms and other health problems. For severe allergies that cause reactions that affect your whole body (anaphylactic reactions), your doctor may prescribe a shot of epinephrine to carry with you in case you have a severe reaction. Learn how to give yourself the shot and keep it with you at all times. Make sure it is not . Follow-up care is a key part of your treatment and safety.  Be sure to make and go to all appointments, and call your doctor if you are having problems. It's also a good idea to know your test results and keep a list of the medicines you take. How can you care for yourself at home? · If you have been told by your doctor that dust or dust mites are causing your allergy, decrease the dust around your bed: 
[facility] AUTHORITY sheets, pillowcases, and other bedding in hot water every week. ¨ Use dust-proof covers for pillows, duvets, and mattresses. Avoid plastic covers because they tear easily and do not \"breathe. \" Wash as instructed on the label. ¨ Do not use any blankets and pillows that you do not need. ¨ Use blankets that you can wash in your washing machine. ¨ Consider removing drapes and carpets, which attract and hold dust, from your bedroom. · If you are allergic to house dust and mites, do not use home humidifiers. Your doctor can suggest ways you can control dust and mites. · Look for signs of cockroaches. Cockroaches cause allergic reactions. Use cockroach baits to get rid of them. Then, clean your home well. Cockroaches like areas where grocery bags, newspapers, empty bottles, or cardboard boxes are stored. Do not keep these inside your home, and keep trash and food containers sealed. Seal off any spots where cockroaches might enter your home. · If you are allergic to mold, get rid of furniture, rugs, and drapes that smell musty. Check for mold in the bathroom. · If you are allergic to outdoor pollen or mold spores, use air-conditioning. Change or clean all filters every month. Keep windows closed. · If you are allergic to pollen, stay inside when pollen counts are high. Use a vacuum  with a HEPA filter or a double-thickness filter at least two times each week. · Stay inside when air pollution is bad. Avoid paint fumes, perfumes, and other strong odors. · Avoid conditions that make your allergies worse. Stay away from smoke. Do not smoke or let anyone else smoke in your house. Do not use fireplaces or wood-burning stoves. · If you are allergic to your pets, change the air filter in your furnace every month. Use high-efficiency filters. · If you are allergic to pet dander, keep pets outside or out of your bedroom. Old carpet and cloth furniture can hold a lot of animal dander. You may need to replace them. When should you call for help? Give an epinephrine shot if: 
? · You think you are having a severe allergic reaction. ? · You have symptoms in more than one body area, such as mild nausea and an itchy mouth. ? After giving an epinephrine shot call 911, even if you feel better. ?Call 911 if: 
? · You have symptoms of a severe allergic reaction. These may include: 
¨ Sudden raised, red areas (hives) all over your body. ¨ Swelling of the throat, mouth, lips, or tongue. ¨ Trouble breathing. ¨ Passing out (losing consciousness). Or you may feel very lightheaded or suddenly feel weak, confused, or restless. ? · You have been given an epinephrine shot, even if you feel better. ?Call your doctor now or seek immediate medical care if: 
? · You have symptoms of an allergic reaction, such as: ¨ A rash or hives (raised, red areas on the skin). ¨ Itching. ¨ Swelling. ¨ Belly pain, nausea, or vomiting. ? Watch closely for changes in your health, and be sure to contact your doctor if: 
? · You do not get better as expected. Where can you learn more? Go to http://kyle-elvis.info/. Enter W876 in the search box to learn more about \"Allergies: Care Instructions. \" Current as of: September 29, 2016 Content Version: 11.4 © 3894-3902 [employer]. Care instructions adapted under license by [employer] (which disclaims liability or warranty for this information). If you have questions about a medical condition or this instruction, always ask your healthcare professional. [address] any warranty or liability for your use of this information. Introducing [address]! [facility] introduces [employer] patient portal. Now you can access parts of your medical record, email your doctor's office, and request medication refills online. 1. In your internet browser, go to [email or web address] 2. Click on the First Time User? Click Here link in the Sign In box. You will see the New Member Sign Up page. 3. Enter your [employer] Access Code exactly as it appears below. You will not need to use this code after youve completed the sign-up process. If you do not sign up before the expiration date, you must request a new code. · [employer] Access Code: [email or web address] Expires: 5/7/2018  5:10 PM 
 
4. Enter the last four digits of your Social Security Number (xxxx) and Date of Birth (mm/dd/yyyy) as indicated and click Submit. You will be taken to the next sign-up page. 5. Create a [employer] ID. This will be your [employer] login ID and cannot be changed, so think of one that is secure and easy to remember. 6. Create a [employer] password. You can change your password at any time. 7. Enter your Password Reset Question and Answer. This can be used at a later time if you forget your password. 8. Enter your e-mail address. You will receive e-mail notification when new information is available in [address] 9. Click Sign Up. You can now view and download portions of your medical record. 10. Click the Download Summary menu link to download a portable copy of your medical information. If you have questions, please visit the Frequently Asked Questions section of the [employer] website. Remember, [employer] is NOT to be used for urgent needs. For medical emergencies, dial 911. Now available from your iPhone and Android! Please provide this summary of care documentation to your next provider. Your primary care clinician is listed as Debra Varma. If you have any questions after today's visit, please call [phone].

## 2018-04-20 NOTE — PATIENT INSTRUCTIONS
Allergies: Care Instructions  Your Care Instructions    Allergies occur when your body's defense system (immune system) overreacts to certain substances. The immune system treats a harmless substance as if it were a harmful germ or virus. Many things can cause this overreaction, including pollens, medicine, food, dust, animal dander, and mold. Allergies can be mild or severe. Mild allergies can be managed with home treatment. But medicine may be needed to prevent problems. Managing your allergies is an important part of staying healthy. Your doctor may suggest that you have allergy testing to help find out what is causing your allergies. When you know what things trigger your symptoms, you can avoid them. This can prevent allergy symptoms and other health problems. For severe allergies that cause reactions that affect your whole body (anaphylactic reactions), your doctor may prescribe a shot of epinephrine to carry with you in case you have a severe reaction. Learn how to give yourself the shot and keep it with you at all times. Make sure it is not . Follow-up care is a key part of your treatment and safety. Be sure to make and go to all appointments, and call your doctor if you are having problems. It's also a good idea to know your test results and keep a list of the medicines you take. How can you care for yourself at home? · If you have been told by your doctor that dust or dust mites are causing your allergy, decrease the dust around your bed:  [facility] AUTHORITY sheets, pillowcases, and other bedding in hot water every week. ¨ Use dust-proof covers for pillows, duvets, and mattresses. Avoid plastic covers because they tear easily and do not \"breathe. \" Wash as instructed on the label. ¨ Do not use any blankets and pillows that you do not need. ¨ Use blankets that you can wash in your washing machine. ¨ Consider removing drapes and carpets, which attract and hold dust, from your bedroom.   · If you are allergic to house dust and mites, do not use home humidifiers. Your doctor can suggest ways you can control dust and mites. · Look for signs of cockroaches. Cockroaches cause allergic reactions. Use cockroach baits to get rid of them. Then, clean your home well. Cockroaches like areas where grocery bags, newspapers, empty bottles, or cardboard boxes are stored. Do not keep these inside your home, and keep trash and food containers sealed. Seal off any spots where cockroaches might enter your home. · If you are allergic to mold, get rid of furniture, rugs, and drapes that smell musty. Check for mold in the bathroom. · If you are allergic to outdoor pollen or mold spores, use air-conditioning. Change or clean all filters every month. Keep windows closed. · If you are allergic to pollen, stay inside when pollen counts are high. Use a vacuum  with a HEPA filter or a double-thickness filter at least two times each week. · Stay inside when air pollution is bad. Avoid paint fumes, perfumes, and other strong odors. · Avoid conditions that make your allergies worse. Stay away from smoke. Do not smoke or let anyone else smoke in your house. Do not use fireplaces or wood-burning stoves. · If you are allergic to your pets, change the air filter in your furnace every month. Use high-efficiency filters. · If you are allergic to pet dander, keep pets outside or out of your bedroom. Old carpet and cloth furniture can hold a lot of animal dander. You may need to replace them. When should you call for help? Give an epinephrine shot if:  ? · You think you are having a severe allergic reaction. ? · You have symptoms in more than one body area, such as mild nausea and an itchy mouth. ? After giving an epinephrine shot call 911, even if you feel better. ?Call 911 if:  ? · You have symptoms of a severe allergic reaction. These may include:  ¨ Sudden raised, red areas (hives) all over your body.   ¨ Swelling of the throat, mouth, lips, or tongue. ¨ Trouble breathing. ¨ Passing out (losing consciousness). Or you may feel very lightheaded or suddenly feel weak, confused, or restless. ? · You have been given an epinephrine shot, even if you feel better. ?Call your doctor now or seek immediate medical care if:  ? · You have symptoms of an allergic reaction, such as:  ¨ A rash or hives (raised, red areas on the skin). ¨ Itching. ¨ Swelling. ¨ Belly pain, nausea, or vomiting. ? Watch closely for changes in your health, and be sure to contact your doctor if:  ? · You do not get better as expected. Where can you learn more? Go to http://kyle-elvis.info/. Enter Z220 in the search box to learn more about \"Allergies: Care Instructions. \"  Current as of: September 29, 2016  Content Version: 11.4  © 1850-7749 [employer]. Care instructions adapted under license by [employer] (which disclaims liability or warranty for this information). If you have questions about a medical condition or this instruction, always ask your healthcare professional. [address] [address] any warranty or liability for your use of this information.

## 2018-08-09 DIAGNOSIS — M81.0 POST-MENOPAUSAL OSTEOPOROSIS: ICD-10-CM

## 2018-08-09 RX ORDER — RISEDRONATE SODIUM 35 MG/1
TABLET, FILM COATED ORAL
Qty: 12 TAB | Refills: 2 | Status: SHIPPED | OUTPATIENT
Start: 2018-08-09

## 2018-09-27 ENCOUNTER — OFFICE VISIT (OUTPATIENT)
Dept: FAMILY MEDICINE CLINIC | Age: 68
End: 2018-09-27

## 2018-09-27 VITALS
DIASTOLIC BLOOD PRESSURE: 86 MMHG | SYSTOLIC BLOOD PRESSURE: 134 MMHG | OXYGEN SATURATION: 98 % | HEART RATE: 71 BPM | TEMPERATURE: 97.5 F | HEIGHT: 66 IN | WEIGHT: 166.2 LBS | BODY MASS INDEX: 26.71 KG/M2 | RESPIRATION RATE: 17 BRPM

## 2018-09-27 DIAGNOSIS — Z71.89 ADVANCE CARE PLANNING: ICD-10-CM

## 2018-09-27 DIAGNOSIS — E78.00 HYPERCHOLESTEROLEMIA: ICD-10-CM

## 2018-09-27 DIAGNOSIS — R73.9 HYPERGLYCEMIA: ICD-10-CM

## 2018-09-27 DIAGNOSIS — L71.9 ROSACEA: ICD-10-CM

## 2018-09-27 DIAGNOSIS — M81.0 POST-MENOPAUSAL OSTEOPOROSIS: ICD-10-CM

## 2018-09-27 DIAGNOSIS — Z00.00 MEDICARE ANNUAL WELLNESS VISIT, INITIAL: Primary | ICD-10-CM

## 2018-09-27 DIAGNOSIS — Z23 NEEDS FLU SHOT: ICD-10-CM

## 2018-09-27 RX ORDER — METRONIDAZOLE 10 MG/G
GEL TOPICAL DAILY
Qty: 45 G | Refills: 3 | Status: SHIPPED | OUTPATIENT
Start: 2018-09-27

## 2018-09-27 NOTE — PATIENT INSTRUCTIONS

## 2018-09-27 NOTE — PROGRESS NOTES
Colby Perez is a 79 y.o female that is present in the office for a medicare wellness appointment. 1. Have you been to the ER, urgent care clinic since your last visit? Hospitalized since your last visit? No 
 
2. Have you seen or consulted any other health care providers outside of the [address] [place] [address] since your last visit? Include any pap smears or colon screening. No  
 
Health Maintenance Due Topic Date Due  
 Hepatitis C Screening  1950  DTaP/Tdap/Td series (1 - Tdap) 02/19/1973  Shingrix Vaccine Age 50> (1 of 2) 02/19/2002  Influenza Age 5 to Adult  08/01/2018  MEDICARE YEARLY EXAM  09/23/2018

## 2018-09-27 NOTE — PROGRESS NOTES
(AWV) The Initial Medicare Annual Wellness Exam PROGRESS NOTE This is an Initial Medicare Annual Wellness Exam (AWV) (Performed 12 months after IPPE or effective date of Medicare Part B enrollment, Once in a lifetime) I have reviewed the patient's medical history in detail and updated the computerized patient record. Sindi Boland is a 79 y.o.  female and presents for an annual wellness exam  
 
 
Patient Active Problem List  
Diagnosis Code  Post-menopausal osteoporosis M81.0 Patient Active Problem List  
 Diagnosis Date Noted  Post-menopausal osteoporosis 08/28/2017 Current Outpatient Prescriptions Medication Sig Dispense Refill  risedronate (ACTONEL) 35 mg tablet TAKE 1 TAB BY MOUTH EVERY SEVEN DAYS. 12 Tab 2  
 metroNIDAZOLE (METROGEL) 1 % topical gel Apply  to affected area daily. Use a thin layer to affected areas after washing  Sulfacetamide Sodium-Sulfur (AVAR-E LS) 10-2 % crea by Apply Externally route.  loratadine (CLARITIN) 10 mg tablet Take 10 mg by mouth. No Known Allergies Past Medical History:  
Diagnosis Date  Osteoporosis  Rosacea  TIA (transient ischemic attack) 2012 Past Surgical History:  
Procedure Laterality Date  HX COLONOSCOPY Family History Problem Relation Age of Onset  Diabetes Mother  Osteoporosis Father  Heart Disease Father Social History Substance Use Topics  Smoking status: Never Smoker  Smokeless tobacco: Never Used  Alcohol use No  
 
 
ROS General ROS: negative for - chills or fever Psychological ROS: negative for - anxiety or depression Ophthalmic ROS: negative for - blurry vision ENT ROS: negative for - headaches Endocrine ROS: negative for - polydipsia/polyuria or temperature intolerance Respiratory ROS: no cough, shortness of breath, or wheezing Cardiovascular ROS: no chest pain or dyspnea on exertion Gastrointestinal ROS: no abdominal pain, change in bowel habits, or black or bloody stools Genito-Urinary ROS: no dysuria, trouble voiding, or hematuria Musculoskeletal ROS: negative for - joint pain or muscle pain Neurological ROS: no TIA or stroke symptoms Dermatological ROS: negative for - rash or skin lesion changes All other systems reviewed and are negative. History Past Medical History:  
Diagnosis Date  Osteoporosis  Rosacea  TIA (transient ischemic attack) 2012 Past Surgical History:  
Procedure Laterality Date  HX COLONOSCOPY Current Outpatient Prescriptions Medication Sig Dispense Refill  risedronate (ACTONEL) 35 mg tablet TAKE 1 TAB BY MOUTH EVERY SEVEN DAYS. 12 Tab 2  
 metroNIDAZOLE (METROGEL) 1 % topical gel Apply  to affected area daily. Use a thin layer to affected areas after washing  Sulfacetamide Sodium-Sulfur (AVAR-E LS) 10-2 % crea by Apply Externally route.  loratadine (CLARITIN) 10 mg tablet Take 10 mg by mouth. No Known Allergies Family History Problem Relation Age of Onset  Diabetes Mother  Osteoporosis Father  Heart Disease Father Social History Substance Use Topics  Smoking status: Never Smoker  Smokeless tobacco: Never Used  Alcohol use No  
 
Patient Active Problem List  
Diagnosis Code  Post-menopausal osteoporosis M81.0 Health Maintenance History Immunizations reviewed, dtap up to date , pneumovax up to date, flu due, zoster up to date Colonoscopy: up to date, Eye exam: up to date Mammo up to date Dexascan up to date Depression Risk Factor Screening:  
  
Patient Health Questionnaire (PHQ-2) Over the last 2 weeks, how often have you been bothered by any of the following problems? · Little interest or pleasure in doing things? · Not at all. [0] · Feeling down, depressed, or hopeless? · Not at all. [0] Total Score: 0/6 PHQ-2 Assessment Scoring: A score of 2 or more requires further screening with the PHQ-9 Alcohol Risk Factor Screening:  
 
Women: On any occasion during the past 3 months, have you had more than 3 drinks containing alcohol?  no 
 Do you average more than 7 drinks per week?  no 
 
Functional Ability and Level of Safety:  
 
Hearing Loss Hearing is good. Activities of Daily Living Self-care. Requires assistance with: no ADLs Fall Risk No fall risk factors Abuse Screen Patient is not abused Examination Physical Examination Vitals:  
 09/27/18 1539 BP: 134/86 Pulse: 71 Resp: 17 Temp: 97.5 °F (36.4 °C) TempSrc: Oral  
SpO2: 98% Height: 5' 6\" (1.676 m) PainSc:   0 - No pain There is no height or weight on file to calculate BMI. Evaluation of Cognitive Function: 
Mood/affect:good mood with appropriate affect Appearance: well appearing Family member/caregiver input:n/a 
 
alert, well appearing, and in no distress, oriented to person, place, and time and normal appearing weight Patient Care Team: 
Ehsan Olson MD as PCP - [facility]) End-of-life planning Advanced Directive in the case than an injury or illness causes the patient to be unable to make health care decisions Health Care Directive or Living Will: yes Advice/Referrals/Counselling/Plan:  
Education and counseling provided: 
End-of-Life planning (with patient's consent) Influenza Vaccine Cardiovascular screening blood test 
Diabetes screening test 
weight loss, physical activity, and nutrition ICD-10-CM ICD-9-CM 1. Medicare annual wellness visit, initial Z00.00 V70.0 LIPID PANEL  
   HEMOGLOBIN A1C WITH EAG 2. Advance care planning Z71.89 V65.49   
3. Needs flu shot Z23 V04.81 INFLUENZA VACCINE INACTIVATED (IIV), SUBUNIT, ADJUVANTED, IM  
   ADMIN INFLUENZA VIRUS VAC 4. Post-menopausal osteoporosis M81.0 733.01   
5. Rosacea L71.9 695.3 metroNIDAZOLE (METROGEL) 1 % topical gel [place] Brief written plan, checklist 
 
I have discussed the diagnosis with the patient and the intended plan as seen in the above orders. The patient has received an after-visit summary and questions were answered concerning future plans. I have discussed medication side effects and warnings with the patient as well. I have reviewed the plan of care with the patient, accepted their input and they are in agreement with the treatment goals. Follow-up Disposition: 
Return in about 1 year (around 9/27/2019), or if symptoms worsen or fail to improve, for medication evaluation. ____________________________________________________________ Problem Assessment 
 
for treatment of  
Chief Complaint Patient presents with Walt Gomes Annual Wellness Visit SUBJECTIVE Well Adult Physical  
Patient here for a comprehensive physical exam.The patient reports problems - rosacea, osteoporosis Do you take any herbs or supplements that were not prescribed by a doctor? no Are you taking calcium supplements? yes Are you taking aspirin daily? no 
 
 
Visit Vitals  /86 (BP 1 Location: Right arm, BP Patient Position: Sitting)  Pulse 71  Temp 97.5 °F (36.4 °C) (Oral)  Resp 17  Ht 5' 6\" (1.676 m)  Wt 166 lb 3.2 oz (75.4 kg)  SpO2 98%  BMI 26.83 kg/m2 General:  Alert, cooperative, no distress, appears stated age. Head:  Normocephalic, without obvious abnormality, atraumatic. Eyes:  Conjunctivae/corneas clear. PERRL, EOMs intact. Ears:  Normal TMs and external ear canals both ears. Nose: Nares normal. Septum midline. Mucosa normal. No drainage or sinus tenderness. Throat: Lips, mucosa, and tongue normal. Teeth and gums normal.  
Neck: Supple, symmetrical, trachea midline, no adenopathy, thyroid: no enlargement/tenderness/nodules Back:   Symmetric, no curvature. ROM normal.  
Lungs:   Clear to auscultation bilaterally. Chest wall:  No tenderness or deformity. Heart:  Regular rate and rhythm, S1, S2 normal, no murmur, click, rub or gallop. Breast Exam:  deferred Abdomen:   Soft, non-tender. Bowel sounds normal. No masses,  No organomegaly. Genitalia:  deferred Rectal:  deferred Extremities: Extremities normal, atraumatic, no cyanosis or edema. Pulses: 2+ and symmetric all extremities. Skin: Skin color, texture, turgor normal. No rashes or lesions. Lymph nodes: Cervical, supraclavicular, and axillary nodes normal.  
Neurologic: CNII-XII intact. Normal strength, sensation and reflexes throughout. Assessment/Plan: 1. Medicare annual wellness visit, initial 
Reviewed preventive recommendations - LIPID PANEL; Future 
- HEMOGLOBIN A1C WITH EAG; Future 2. Advance care planning See ACP 3. Needs flu shot 
 
- INFLUENZA VACCINE INACTIVATED (IIV), SUBUNIT, ADJUVANTED, IM 
- ADMIN INFLUENZA VIRUS VAC 4. Post-menopausal osteoporosis Continue risedronate 5. Rosacea Well controlled with current treatment 
- metroNIDAZOLE (METROGEL) 1 % topical gel; Apply  to affected area daily. Use a thin layer to affected areas after washing  Dispense: 45 g; Refill: 1 Lab review: no lab studies available for review at time of visit

## 2018-09-27 NOTE — MR AVS SNAPSHOT
[address] [place] [address] [address] [id number] 
[phone] Patient: Tony Ernandez MRN: [id number] Visit Information Date & Time Provider Department Dept. Phone Encounter #  
 9/27/2018  3:00 PM Yuval Negron, [address] [id number] 20 [phone] Follow-up Instructions Return in about 1 year (around 9/27/2019), or if symptoms worsen or fail to improve, for medication evaluation. Upcoming Health Maintenance Date Due Hepatitis C Screening 1950 DTaP/Tdap/Td series (1 - Tdap) 2/19/1973 Shingrix Vaccine Age 50> (1 of 2) 2/19/2002 Influenza Age 5 to Adult 8/1/2018 MEDICARE YEARLY EXAM 9/23/2018 Pneumococcal 65+ Low/Medium Risk (2 of 2 - PPSV23) 3/21/2019 GLAUCOMA SCREENING Q2Y 10/12/2019 BREAST CANCER SCRN MAMMOGRAM 2/20/2020 COLONOSCOPY 7/16/2022 Allergies as of 9/27/2018  Review Complete On: 9/27/2018 By: Yuval Negron MD  
 No Known Allergies Current Immunizations  Reviewed on 8/26/2017 Name Date Hep B Vaccine (Adult) 9/21/2017 Influenza High Dose Vaccine PF 8/23/2017 Influenza Vaccine (Tri) Adjuvanted 9/27/2018 Pneumococcal Conjugate (PCV-13) 3/21/2018 Not reviewed this visit You Were Diagnosed With   
  
 Codes Comments Medicare annual wellness visit, initial    -  Primary ICD-10-CM: Z00.00 ICD-9-CM: V70.0 Advance care planning     ICD-10-CM: Z71.89 ICD-9-CM: V65.49 Needs flu shot     ICD-10-CM: M00 ICD-9-CM: V04.81 Post-menopausal osteoporosis     ICD-10-CM: M81.0 ICD-9-CM: 733.01 Rosacea     ICD-10-CM: L71.9 ICD-9-CM: 695.3 Vitals BP Pulse Temp Resp Height(growth percentile) Weight(growth percentile) 134/86 (BP 1 Location: Right arm, BP Patient Position: Sitting) 71 97.5 °F (36.4 °C) (Oral) 17 5' 6\" (1.676 m) 168 lb (76.2 kg) SpO2 BMI OB Status Smoking Status 98% 27.12 kg/m2 Postmenopausal Never Smoker Vitals History BMI and BSA Data Body Mass Index Body Surface Area  
 27.12 kg/m 2 1.88 m 2 Preferred Pharmacy Pharmacy Name Phone [id number] Your Updated Medication List  
  
   
This list is accurate as of 9/27/18  4:24 PM.  Always use your most recent med list.  
  
  
  
  
 AVAR-E LS 10-2 % Crea Generic drug:  Sulfacetamide Sodium-Sulfur  
by Apply Externally route. CLARITIN 10 mg tablet Generic drug:  loratadine Take 10 mg by mouth.  
  
 metroNIDAZOLE 1 % topical gel Commonly known as:  Justice Efren Apply  to affected area daily. Use a thin layer to affected areas after washing  
  
 risedronate 35 mg tablet Commonly known as:  ACTONEL  
TAKE 1 TAB BY MOUTH EVERY SEVEN DAYS. Prescriptions Sent to Pharmacy Refills  
 metroNIDAZOLE (METROGEL) 1 % topical gel 3 Sig: Apply  to affected area daily. Use a thin layer to affected areas after washing Class: Normal  
 Pharmacy: [address] [place] [address] #: [phone] Route: Topical  
  
We Performed the Following ADMIN INFLUENZA VIRUS VAC [ \[facility] [place] [facility]\""] INFLUENZA VACCINE INACTIVATED (IIV), SUBUNIT, ADJUVANTED, IM [id number] CPT(R)] Follow-up Instructions Return in about 1 year (around 9/27/2019), or if symptoms worsen or fail to improve, for medication evaluation. To-Do List   
 09/27/2018 Lab:  HEMOGLOBIN A1C WITH EAG   
  
 09/27/2018 Lab:  LIPID PANEL Patient Instructions Well Visit, Over 72: Care Instructions Your Care Instructions Physical exams can help you stay healthy. Your doctor has checked your overall health and may have suggested ways to take good care of yourself. He or she also may have recommended tests.  At home, you can help prevent illness with healthy eating, regular exercise, and other steps. Follow-up care is a key part of your treatment and safety. Be sure to make and go to all appointments, and call your doctor if you are having problems. It's also a good idea to know your test results and keep a list of the medicines you take. How can you care for yourself at home? · Reach and stay at a healthy weight. This will lower your risk for many problems, such as obesity, diabetes, heart disease, and high blood pressure. · Get at least 30 minutes of exercise on most days of the week. Walking is a good choice. You also may want to do other activities, such as running, swimming, cycling, or playing tennis or team sports. · Do not smoke. Smoking can make health problems worse. If you need help quitting, talk to your doctor about stop-smoking programs and medicines. These can increase your chances of quitting for good. · Protect your skin from too much sun. When you're outdoors from 10 a.m. to 4 p.m., stay in the shade or cover up with clothing and a hat with a wide brim. Wear sunglasses that block UV rays. Even when it's cloudy, put broad-spectrum sunscreen (SPF 30 or higher) on any exposed skin. · See a dentist one or two times a year for checkups and to have your teeth cleaned. · Wear a seat belt in the car. · Limit alcohol to 2 drinks a day for men and 1 drink a day for women. Too much alcohol can cause health problems. Follow your doctor's advice about when to have certain tests. These tests can spot problems early. For men and women · Cholesterol. Your doctor will tell you how often to have this done based on your overall health and other things that can increase your risk for heart attack and stroke. · Blood pressure. Have your blood pressure checked during a routine doctor visit. Your doctor will tell you how often to check your blood pressure based on your age, your blood pressure results, and other factors. · Diabetes. Ask your doctor whether you should have tests for diabetes. · Vision. Experts recommend that you have yearly exams for glaucoma and other age-related eye problems. · Hearing. Tell your doctor if you notice any change in your hearing. You can have tests to find out how well you hear. · Colon cancer tests. Keep having colon cancer tests as your doctor recommends. You can have one of several types of tests. · Heart attack and stroke risk. At least every 4 to 6 years, you should have your risk for heart attack and stroke assessed. Your doctor uses factors such as your age, blood pressure, cholesterol, and whether you smoke or have diabetes to show what your risk for a heart attack or stroke is over the next 10 years. · Osteoporosis. Talk to your doctor about whether you should have a bone density test to find out whether you have thinning bones. Also ask your doctor about whether you should take calcium and vitamin D supplements. For women · Pap test and pelvic exam. You may no longer need a Pap test. Talk with your doctor about whether to stop or continue to have Pap tests. · Breast exam and mammogram. Ask how often you should have a mammogram, which is an X-ray of your breasts. A mammogram can spot breast cancer before it can be felt and when it is easiest to treat. · Thyroid disease. Talk to your doctor about whether to have your thyroid checked as part of a regular physical exam. Women have an increased chance of a thyroid problem. For men · Prostate exam. Talk to your doctor about whether you should have a blood test (called a PSA test) for prostate cancer. Experts disagree on whether men should have this test. Some experts recommend that you discuss the benefits and risks of the test with your doctor. · Abdominal aortic aneurysm. Ask your doctor whether you should have a test to check for an aneurysm.  You may need a test if you ever smoked or if your parent, brother, sister, or child has had an aneurysm. When should you call for help? Watch closely for changes in your health, and be sure to contact your doctor if you have any problems or symptoms that concern you. Where can you learn more? Go to http://kyle-elvis.info/. Enter D235 in the search box to learn more about \"Well Visit, Over 65: Care Instructions. \" Current as of: May 16, 2017 Content Version: 11.7 © 8486-2481 [employer]. Care instructions adapted under license by [employer] [facility] (which disclaims liability or warranty for this information). If you have questions about a medical condition or this instruction, always ask your healthcare professional. [address] any warranty or liability for your use of this information. Introducing [facility]! [employer] introduces [employer] patient portal. Now you can access parts of your medical record, email your doctor's office, and request medication refills online. 1. In your internet browser, go to [email or web address] 2. Click on the First Time User? Click Here link in the Sign In box. You will see the New Member Sign Up page. 3. Enter your [employer] Access Code exactly as it appears below. You will not need to use this code after youve completed the sign-up process. If you do not sign up before the expiration date, you must request a new code. · [employer] Access Code: [email or web address] Expires: 12/26/2018  4:22 PM 
 
4. Enter the last four digits of your Social Security Number (xxxx) and Date of Birth (mm/dd/yyyy) as indicated and click Submit. You will be taken to the next sign-up page. 5. Create a [employer] ID. This will be your [employer] login ID and cannot be changed, so think of one that is secure and easy to remember. 6. Create a [employer] password. You can change your password at any time. 7. Enter your Password Reset Question and Answer. This can be used at a later time if you forget your password. 8. Enter your e-mail address. You will receive e-mail notification when new information is available in [address] 9. Click Sign Up. You can now view and download portions of your medical record. 10. Click the Download Summary menu link to download a portable copy of your medical information. If you have questions, please visit the Frequently Asked Questions section of the [employer] website. Remember, [employer] is NOT to be used for urgent needs. For medical emergencies, dial 911. Now available from your iPhone and Android! Please provide this summary of care documentation to your next provider. Your primary care clinician is listed as Phil Osgood. If you have any questions after today's visit, please call [phone].

## 2018-09-28 ENCOUNTER — HOSPITAL ENCOUNTER (OUTPATIENT)
Dept: LAB | Age: 68
Discharge: HOME | End: 2018-09-28
Payer: MEDICARE

## 2018-09-28 DIAGNOSIS — Z00.00 MEDICARE ANNUAL WELLNESS VISIT, INITIAL: ICD-10-CM

## 2018-09-28 LAB
CHOLEST SERPL-MCNC: 205 MG/DL
EST. AVERAGE GLUCOSE BLD GHB EST-MCNC: 105 MG/DL
HBA1C MFR BLD: 5.3 % (ref 4.2–5.6)
HDLC SERPL-MCNC: 59 MG/DL (ref 40–60)
HDLC SERPL: 3.5 {RATIO} (ref 0–5)
LDLC SERPL CALC-MCNC: 131 MG/DL (ref 0–100)
LIPID PROFILE,FLP: ABNORMAL
TRIGL SERPL-MCNC: 75 MG/DL (ref ?–150)
VLDLC SERPL CALC-MCNC: 15 MG/DL

## 2018-09-28 PROCEDURE — 36415 COLL VENOUS BLD VENIPUNCTURE: CPT | Performed by: FAMILY MEDICINE

## 2018-09-28 PROCEDURE — 80061 LIPID PANEL: CPT | Performed by: FAMILY MEDICINE

## 2018-09-28 PROCEDURE — 83036 HEMOGLOBIN GLYCOSYLATED A1C: CPT | Performed by: FAMILY MEDICINE

## 2018-09-30 NOTE — ACP (ADVANCE CARE PLANNING)
Advance Care Planning (ACP) Provider Note - Comprehensive     Date of ACP Conversation: 09/27/18  Persons included in Conversation:  patient  Length of ACP Conversation in minutes:  <16 minutes (Non-Billable)    Authorized Decision Maker (if patient is incapable of making informed decisions): This person is:  her           General ACP for ALL Patients with Decision Making Capacity:   Importance of advance care planning, including choosing a healthcare agent to communicate patient's healthcare decisions if patient lost the ability to make decisions, such as after a sudden illness or accident  Understanding of the healthcare agent role was assessed and information provided  Exploration of values, goals, and preferences if recovery is not expected, even with continued medical treatment in the event of: Imminent death  Severe, permanent brain injury  \"In these circumstances, what matters most to you? \"  Care focused more on comfort or quality of life. \"What, if any, treatments would you want to avoid? \" none  Opportunity offered to explore how cultural, Temple, spiritual, or personal beliefs would affect decisions for future care     Review of Existing Advance Directive:  What information were you given about medical decisions to consider before completing your advance directive?  info for living well    For Serious or Chronic Illness:  No known illness    Interventions Provided:  Reviewed existing Advance Directive

## 2018-10-08 PROBLEM — E78.00 HYPERCHOLESTEROLEMIA: Status: ACTIVE | Noted: 2018-10-08

## 2019-11-12 ENCOUNTER — TELEPHONE (OUTPATIENT)
Dept: FAMILY MEDICINE CLINIC | Age: 69
End: 2019-11-12

## 2019-11-12 NOTE — TELEPHONE ENCOUNTER
LVM to return phone call-Please inform patient that any medical record needs to be released, she will need to sign the release of medical form in the office. Also advise the patient that if she cannot come in the office, she can fill out a medical record release at her new provider office and have the form faxed to the office.

## 2019-11-13 NOTE — TELEPHONE ENCOUNTER
Pt called in and was returning Terra's phone call and she was confused by the voicemail so I explained what the message was and she voiced an understanding.